# Patient Record
Sex: FEMALE
[De-identification: names, ages, dates, MRNs, and addresses within clinical notes are randomized per-mention and may not be internally consistent; named-entity substitution may affect disease eponyms.]

---

## 2017-05-09 ENCOUNTER — APPOINTMENT (OUTPATIENT)
Dept: INTERNAL MEDICINE | Facility: CLINIC | Age: 22
End: 2017-05-09

## 2017-05-09 VITALS
OXYGEN SATURATION: 98 % | RESPIRATION RATE: 16 BRPM | BODY MASS INDEX: 25.46 KG/M2 | HEART RATE: 76 BPM | WEIGHT: 168 LBS | SYSTOLIC BLOOD PRESSURE: 122 MMHG | HEIGHT: 68 IN | DIASTOLIC BLOOD PRESSURE: 78 MMHG | TEMPERATURE: 98.4 F

## 2017-05-09 DIAGNOSIS — Z82.49 FAMILY HISTORY OF ISCHEMIC HEART DISEASE AND OTHER DISEASES OF THE CIRCULATORY SYSTEM: ICD-10-CM

## 2017-05-09 DIAGNOSIS — Z87.891 PERSONAL HISTORY OF NICOTINE DEPENDENCE: ICD-10-CM

## 2017-07-05 ENCOUNTER — APPOINTMENT (OUTPATIENT)
Dept: ORTHOPEDIC SURGERY | Facility: CLINIC | Age: 22
End: 2017-07-05

## 2017-07-05 VITALS
DIASTOLIC BLOOD PRESSURE: 60 MMHG | SYSTOLIC BLOOD PRESSURE: 125 MMHG | HEART RATE: 87 BPM | HEIGHT: 68 IN | BODY MASS INDEX: 24.55 KG/M2 | WEIGHT: 162 LBS

## 2017-07-05 DIAGNOSIS — Z60.2 PROBLEMS RELATED TO LIVING ALONE: ICD-10-CM

## 2017-07-05 DIAGNOSIS — Z78.9 OTHER SPECIFIED HEALTH STATUS: ICD-10-CM

## 2017-07-05 SDOH — SOCIAL STABILITY - SOCIAL INSECURITY: PROBLEMS RELATED TO LIVING ALONE: Z60.2

## 2017-07-24 ENCOUNTER — OUTPATIENT (OUTPATIENT)
Dept: OUTPATIENT SERVICES | Facility: HOSPITAL | Age: 22
LOS: 1 days | Discharge: HOME | End: 2017-07-24
Payer: OTHER GOVERNMENT

## 2017-07-24 VITALS
DIASTOLIC BLOOD PRESSURE: 77 MMHG | WEIGHT: 166.01 LBS | TEMPERATURE: 98 F | RESPIRATION RATE: 18 BRPM | HEIGHT: 68 IN | HEART RATE: 74 BPM | SYSTOLIC BLOOD PRESSURE: 142 MMHG | OXYGEN SATURATION: 97 %

## 2017-07-24 DIAGNOSIS — Z01.818 ENCOUNTER FOR OTHER PREPROCEDURAL EXAMINATION: ICD-10-CM

## 2017-07-24 DIAGNOSIS — E28.2 POLYCYSTIC OVARIAN SYNDROME: ICD-10-CM

## 2017-07-24 DIAGNOSIS — S43.491A OTHER SPRAIN OF RIGHT SHOULDER JOINT, INITIAL ENCOUNTER: ICD-10-CM

## 2017-07-24 DIAGNOSIS — M25.511 PAIN IN RIGHT SHOULDER: ICD-10-CM

## 2017-07-24 LAB
ANION GAP SERPL CALC-SCNC: 6 MMOL/L — SIGNIFICANT CHANGE UP (ref 5–17)
BASOPHILS # BLD AUTO: 0.1 K/UL — SIGNIFICANT CHANGE UP (ref 0–0.2)
BASOPHILS NFR BLD AUTO: 1 % — SIGNIFICANT CHANGE UP (ref 0–2)
BUN SERPL-MCNC: 11 MG/DL — SIGNIFICANT CHANGE UP (ref 7–23)
CALCIUM SERPL-MCNC: 9.4 MG/DL — SIGNIFICANT CHANGE UP (ref 8.5–10.1)
CHLORIDE SERPL-SCNC: 106 MMOL/L — SIGNIFICANT CHANGE UP (ref 96–108)
CO2 SERPL-SCNC: 30 MMOL/L — SIGNIFICANT CHANGE UP (ref 22–31)
CREAT SERPL-MCNC: 0.78 MG/DL — SIGNIFICANT CHANGE UP (ref 0.5–1.3)
EOSINOPHIL # BLD AUTO: 0.3 K/UL — SIGNIFICANT CHANGE UP (ref 0–0.5)
EOSINOPHIL NFR BLD AUTO: 3.5 % — SIGNIFICANT CHANGE UP (ref 0–6)
GLUCOSE SERPL-MCNC: 74 MG/DL — SIGNIFICANT CHANGE UP (ref 70–99)
HCG UR QL: NEGATIVE — SIGNIFICANT CHANGE UP
HCT VFR BLD CALC: 44.1 % — SIGNIFICANT CHANGE UP (ref 34.5–45)
HGB BLD-MCNC: 14.5 G/DL — SIGNIFICANT CHANGE UP (ref 11.5–15.5)
LYMPHOCYTES # BLD AUTO: 2.6 K/UL — SIGNIFICANT CHANGE UP (ref 1–3.3)
LYMPHOCYTES # BLD AUTO: 35.8 % — SIGNIFICANT CHANGE UP (ref 13–44)
MCHC RBC-ENTMCNC: 31.2 PG — SIGNIFICANT CHANGE UP (ref 27–34)
MCHC RBC-ENTMCNC: 32.9 GM/DL — SIGNIFICANT CHANGE UP (ref 32–36)
MCV RBC AUTO: 94.8 FL — SIGNIFICANT CHANGE UP (ref 80–100)
MONOCYTES # BLD AUTO: 0.5 K/UL — SIGNIFICANT CHANGE UP (ref 0–0.9)
MONOCYTES NFR BLD AUTO: 6.1 % — SIGNIFICANT CHANGE UP (ref 2–14)
NEUTROPHILS # BLD AUTO: 4 K/UL — SIGNIFICANT CHANGE UP (ref 1.8–7.4)
NEUTROPHILS NFR BLD AUTO: 53.6 % — SIGNIFICANT CHANGE UP (ref 43–77)
PLATELET # BLD AUTO: 286 K/UL — SIGNIFICANT CHANGE UP (ref 150–400)
POTASSIUM SERPL-MCNC: 4.1 MMOL/L — SIGNIFICANT CHANGE UP (ref 3.5–5.3)
POTASSIUM SERPL-SCNC: 4.1 MMOL/L — SIGNIFICANT CHANGE UP (ref 3.5–5.3)
RBC # BLD: 4.65 M/UL — SIGNIFICANT CHANGE UP (ref 3.8–5.2)
RBC # FLD: 12 % — SIGNIFICANT CHANGE UP (ref 11–15)
SODIUM SERPL-SCNC: 142 MMOL/L — SIGNIFICANT CHANGE UP (ref 135–145)
WBC # BLD: 7.4 K/UL — SIGNIFICANT CHANGE UP (ref 3.8–10.5)
WBC # FLD AUTO: 7.4 K/UL — SIGNIFICANT CHANGE UP (ref 3.8–10.5)

## 2017-07-24 PROCEDURE — 93010 ELECTROCARDIOGRAM REPORT: CPT | Mod: NC

## 2017-07-24 RX ORDER — METFORMIN HYDROCHLORIDE 850 MG/1
0 TABLET ORAL
Qty: 0 | Refills: 0 | COMMUNITY

## 2017-07-24 NOTE — H&P PST ADULT - PROBLEM SELECTOR PROBLEM 2
Continue Regimen: Cetaphil cleanser Samples Given: EpiDuo Forte Detail Level: Simple Plan: Will consider genrx generic EpiDuo when available if not covered by insurance PCOS (polycystic ovarian syndrome)

## 2017-07-24 NOTE — H&P PST ADULT - HISTORY OF PRESENT ILLNESS
This is a 22 y/o female with c/o right shoulder pain. Patient is here today for Pre-surgical clearance for elective Right shoulder arthroscopy.

## 2017-07-31 ENCOUNTER — APPOINTMENT (OUTPATIENT)
Dept: ORTHOPEDIC SURGERY | Facility: HOSPITAL | Age: 22
End: 2017-07-31

## 2017-07-31 ENCOUNTER — TRANSCRIPTION ENCOUNTER (OUTPATIENT)
Age: 22
End: 2017-07-31

## 2017-07-31 ENCOUNTER — OUTPATIENT (OUTPATIENT)
Dept: OUTPATIENT SERVICES | Facility: HOSPITAL | Age: 22
LOS: 1 days | Discharge: HOME | End: 2017-07-31
Payer: OTHER GOVERNMENT

## 2017-07-31 ENCOUNTER — RESULT REVIEW (OUTPATIENT)
Age: 22
End: 2017-07-31

## 2017-07-31 VITALS
DIASTOLIC BLOOD PRESSURE: 64 MMHG | WEIGHT: 160.06 LBS | OXYGEN SATURATION: 99 % | TEMPERATURE: 98 F | HEIGHT: 68 IN | HEART RATE: 59 BPM | SYSTOLIC BLOOD PRESSURE: 116 MMHG

## 2017-07-31 VITALS
HEART RATE: 76 BPM | DIASTOLIC BLOOD PRESSURE: 72 MMHG | SYSTOLIC BLOOD PRESSURE: 122 MMHG | RESPIRATION RATE: 17 BRPM | OXYGEN SATURATION: 100 %

## 2017-07-31 PROCEDURE — 29806 SHO ARTHRS SRG CAPSULORRAPHY: CPT | Mod: RT

## 2017-07-31 PROCEDURE — 88304 TISSUE EXAM BY PATHOLOGIST: CPT | Mod: 26

## 2017-07-31 RX ORDER — DOCUSATE SODIUM 100 MG
1 CAPSULE ORAL
Qty: 60 | Refills: 0 | OUTPATIENT
Start: 2017-07-31

## 2017-07-31 RX ORDER — OXYCODONE HYDROCHLORIDE 5 MG/1
1 TABLET ORAL
Qty: 60 | Refills: 0 | OUTPATIENT
Start: 2017-07-31

## 2017-07-31 RX ORDER — ONDANSETRON 8 MG/1
1 TABLET, FILM COATED ORAL
Qty: 10 | Refills: 0 | OUTPATIENT
Start: 2017-07-31

## 2017-07-31 RX ORDER — ACETAMINOPHEN 500 MG
1000 TABLET ORAL ONCE
Qty: 0 | Refills: 0 | Status: COMPLETED | OUTPATIENT
Start: 2017-07-31 | End: 2017-07-31

## 2017-07-31 RX ORDER — SODIUM CHLORIDE 9 MG/ML
1000 INJECTION, SOLUTION INTRAVENOUS
Qty: 0 | Refills: 0 | Status: DISCONTINUED | OUTPATIENT
Start: 2017-07-31 | End: 2017-08-15

## 2017-07-31 RX ADMIN — Medication 1000 MILLIGRAM(S): at 15:00

## 2017-07-31 RX ADMIN — Medication 400 MILLIGRAM(S): at 14:46

## 2017-07-31 RX ADMIN — SODIUM CHLORIDE 85 MILLILITER(S): 9 INJECTION, SOLUTION INTRAVENOUS at 14:46

## 2017-07-31 NOTE — ASU DISCHARGE PLAN (ADULT/PEDIATRIC). - ACTIVITY LEVEL
R Shoulder In Shoulder Immobilizer/no sports/gym/no weight bearing/elevate extremity/no heavy lifting/no exercise

## 2017-07-31 NOTE — ASU DISCHARGE PLAN (ADULT/PEDIATRIC). - MEDICATION SUMMARY - MEDICATIONS TO TAKE
I will START or STAY ON the medications listed below when I get home from the hospital:    oxyCODONE 5 mg oral tablet  -- 1-2 tab(s) by mouth every 4-6 hours, As Needed -for severe pain MDD:12 tabs  -- Caution federal law prohibits the transfer of this drug to any person other  than the person for whom it was prescribed.  It is very important that you take or use this exactly as directed.  Do not skip doses or discontinue unless directed by your doctor.  May cause drowsiness.  Alcohol may intensify this effect.  Use care when operating dangerous machinery.  This prescription cannot be refilled.  Using more of this medication than prescribed may cause serious breathing problems.    -- Indication: For pain    metFORMIN 500 mg oral tablet  --  by mouth 3 times a day  -- Indication: For Home med    ondansetron 4 mg oral tablet, disintegrating  -- 1 tab(s) by mouth every 6 hours, As Needed -for nausea  -- Indication: For Nausea/vomiting    docusate sodium 100 mg oral tablet  -- 1 tab(s) by mouth 3 times a day, As Needed -for constipation  -- Medication should be taken with plenty of water.    -- Indication: For constipation

## 2017-08-01 LAB — SURGICAL PATHOLOGY FINAL REPORT - CH: SIGNIFICANT CHANGE UP

## 2017-08-02 DIAGNOSIS — M24.411 RECURRENT DISLOCATION, RIGHT SHOULDER: ICD-10-CM

## 2017-08-02 DIAGNOSIS — M25.311 OTHER INSTABILITY, RIGHT SHOULDER: ICD-10-CM

## 2017-08-02 DIAGNOSIS — M67.813 OTHER SPECIFIED DISORDERS OF TENDON, RIGHT SHOULDER: ICD-10-CM

## 2017-08-18 ENCOUNTER — APPOINTMENT (OUTPATIENT)
Dept: ORTHOPEDIC SURGERY | Facility: CLINIC | Age: 22
End: 2017-08-18
Payer: OTHER GOVERNMENT

## 2017-08-18 PROCEDURE — 99024 POSTOP FOLLOW-UP VISIT: CPT

## 2017-09-15 ENCOUNTER — APPOINTMENT (OUTPATIENT)
Dept: ORTHOPEDIC SURGERY | Facility: CLINIC | Age: 22
End: 2017-09-15
Payer: OTHER GOVERNMENT

## 2017-09-15 PROCEDURE — 99024 POSTOP FOLLOW-UP VISIT: CPT

## 2017-11-01 ENCOUNTER — APPOINTMENT (OUTPATIENT)
Dept: ORTHOPEDIC SURGERY | Facility: CLINIC | Age: 22
End: 2017-11-01
Payer: OTHER GOVERNMENT

## 2017-11-01 PROCEDURE — 99213 OFFICE O/P EST LOW 20 MIN: CPT

## 2017-11-22 ENCOUNTER — TRANSCRIPTION ENCOUNTER (OUTPATIENT)
Age: 22
End: 2017-11-22

## 2018-02-22 ENCOUNTER — RESULT REVIEW (OUTPATIENT)
Age: 23
End: 2018-02-22

## 2018-03-19 ENCOUNTER — APPOINTMENT (OUTPATIENT)
Dept: INTERNAL MEDICINE | Facility: CLINIC | Age: 23
End: 2018-03-19

## 2018-03-26 ENCOUNTER — APPOINTMENT (OUTPATIENT)
Dept: INTERNAL MEDICINE | Facility: CLINIC | Age: 23
End: 2018-03-26

## 2018-04-02 ENCOUNTER — APPOINTMENT (OUTPATIENT)
Dept: INTERNAL MEDICINE | Facility: CLINIC | Age: 23
End: 2018-04-02
Payer: OTHER GOVERNMENT

## 2018-04-02 VITALS
OXYGEN SATURATION: 97 % | HEART RATE: 81 BPM | SYSTOLIC BLOOD PRESSURE: 129 MMHG | BODY MASS INDEX: 24.55 KG/M2 | RESPIRATION RATE: 16 BRPM | TEMPERATURE: 99.2 F | WEIGHT: 162 LBS | HEIGHT: 68 IN | DIASTOLIC BLOOD PRESSURE: 81 MMHG

## 2018-04-02 DIAGNOSIS — S43.491D OTHER SPRAIN OF RIGHT SHOULDER JOINT, SUBSEQUENT ENCOUNTER: ICD-10-CM

## 2018-04-02 DIAGNOSIS — Z78.9 OTHER SPECIFIED HEALTH STATUS: ICD-10-CM

## 2018-04-02 DIAGNOSIS — M24.112 OTHER ARTICULAR CARTILAGE DISORDERS, LEFT SHOULDER: ICD-10-CM

## 2018-04-02 PROCEDURE — 99395 PREV VISIT EST AGE 18-39: CPT

## 2018-04-02 RX ORDER — METFORMIN HYDROCHLORIDE 625 MG/1
TABLET ORAL
Refills: 0 | Status: DISCONTINUED | COMMUNITY
End: 2018-04-02

## 2018-04-04 DIAGNOSIS — E83.52 HYPERCALCEMIA: ICD-10-CM

## 2018-04-04 LAB
25(OH)D3 SERPL-MCNC: 14.8 NG/ML
ALBUMIN SERPL ELPH-MCNC: 5.2 G/DL
ALP BLD-CCNC: 79 U/L
ALT SERPL-CCNC: 40 U/L
ANION GAP SERPL CALC-SCNC: 15 MMOL/L
APPEARANCE: CLEAR
AST SERPL-CCNC: 47 U/L
BASOPHILS # BLD AUTO: 0.03 K/UL
BASOPHILS NFR BLD AUTO: 0.3 %
BILIRUB SERPL-MCNC: 0.2 MG/DL
BILIRUBIN URINE: NEGATIVE
BLOOD URINE: NEGATIVE
BUN SERPL-MCNC: 19 MG/DL
CALCIUM SERPL-MCNC: 10.8 MG/DL
CALCIUM SERPL-MCNC: 11 MG/DL
CHLORIDE SERPL-SCNC: 101 MMOL/L
CHOLEST SERPL-MCNC: 233 MG/DL
CHOLEST/HDLC SERPL: 4.6 RATIO
CO2 SERPL-SCNC: 24 MMOL/L
COLOR: YELLOW
CREAT SERPL-MCNC: 0.88 MG/DL
EOSINOPHIL # BLD AUTO: 0.15 K/UL
EOSINOPHIL NFR BLD AUTO: 1.3 %
GLUCOSE QUALITATIVE U: NEGATIVE MG/DL
GLUCOSE SERPL-MCNC: 72 MG/DL
HCT VFR BLD CALC: 42.1 %
HDLC SERPL-MCNC: 51 MG/DL
HGB BLD-MCNC: 13.9 G/DL
IMM GRANULOCYTES NFR BLD AUTO: 0.2 %
KETONES URINE: NEGATIVE
LDLC SERPL CALC-MCNC: 143 MG/DL
LEUKOCYTE ESTERASE URINE: NEGATIVE
LYMPHOCYTES # BLD AUTO: 3.13 K/UL
LYMPHOCYTES NFR BLD AUTO: 26.6 %
MAN DIFF?: NORMAL
MCHC RBC-ENTMCNC: 30.3 PG
MCHC RBC-ENTMCNC: 33 GM/DL
MCV RBC AUTO: 91.9 FL
MONOCYTES # BLD AUTO: 0.83 K/UL
MONOCYTES NFR BLD AUTO: 7.1 %
NEUTROPHILS # BLD AUTO: 7.61 K/UL
NEUTROPHILS NFR BLD AUTO: 64.5 %
NITRITE URINE: NEGATIVE
PARATHYROID HORMONE INTACT: 24 PG/ML
PH URINE: 6.5
PLATELET # BLD AUTO: 331 K/UL
POTASSIUM SERPL-SCNC: 4.2 MMOL/L
PROLACTIN SERPL-MCNC: 8.8 NG/ML
PROT SERPL-MCNC: 8.4 G/DL
PROTEIN URINE: NEGATIVE MG/DL
RBC # BLD: 4.58 M/UL
RBC # FLD: 12.9 %
SODIUM SERPL-SCNC: 140 MMOL/L
SPECIFIC GRAVITY URINE: 1.02
TRIGL SERPL-MCNC: 197 MG/DL
TSH SERPL-ACNC: 1.93 UIU/ML
UROBILINOGEN URINE: NEGATIVE MG/DL
WBC # FLD AUTO: 11.77 K/UL

## 2018-06-05 ENCOUNTER — RX RENEWAL (OUTPATIENT)
Age: 23
End: 2018-06-05

## 2019-03-18 PROBLEM — E28.2 POLYCYSTIC OVARIAN SYNDROME: Chronic | Status: ACTIVE | Noted: 2017-07-24

## 2019-04-30 ENCOUNTER — APPOINTMENT (OUTPATIENT)
Dept: INTERNAL MEDICINE | Facility: CLINIC | Age: 24
End: 2019-04-30
Payer: OTHER GOVERNMENT

## 2019-06-18 ENCOUNTER — LABORATORY RESULT (OUTPATIENT)
Age: 24
End: 2019-06-18

## 2019-06-18 ENCOUNTER — APPOINTMENT (OUTPATIENT)
Dept: INTERNAL MEDICINE | Facility: CLINIC | Age: 24
End: 2019-06-18
Payer: OTHER GOVERNMENT

## 2019-06-18 VITALS
HEIGHT: 68 IN | SYSTOLIC BLOOD PRESSURE: 136 MMHG | DIASTOLIC BLOOD PRESSURE: 81 MMHG | HEART RATE: 82 BPM | BODY MASS INDEX: 26.07 KG/M2 | WEIGHT: 172 LBS | TEMPERATURE: 98.5 F | OXYGEN SATURATION: 82 %

## 2019-06-18 DIAGNOSIS — F32.9 ANXIETY DISORDER, UNSPECIFIED: ICD-10-CM

## 2019-06-18 DIAGNOSIS — Z00.00 ENCOUNTER FOR GENERAL ADULT MEDICAL EXAMINATION W/OUT ABNORMAL FINDINGS: ICD-10-CM

## 2019-06-18 DIAGNOSIS — F41.9 ANXIETY DISORDER, UNSPECIFIED: ICD-10-CM

## 2019-06-18 DIAGNOSIS — R79.89 OTHER SPECIFIED ABNORMAL FINDINGS OF BLOOD CHEMISTRY: ICD-10-CM

## 2019-06-18 DIAGNOSIS — Z87.42 PERSONAL HISTORY OF OTHER DISEASES OF THE FEMALE GENITAL TRACT: ICD-10-CM

## 2019-06-18 DIAGNOSIS — E55.9 VITAMIN D DEFICIENCY, UNSPECIFIED: ICD-10-CM

## 2019-06-18 DIAGNOSIS — R79.9 ABNORMAL FINDING OF BLOOD CHEMISTRY, UNSPECIFIED: ICD-10-CM

## 2019-06-18 DIAGNOSIS — Z11.3 ENCOUNTER FOR SCREENING FOR INFECTIONS WITH A PREDOMINANTLY SEXUAL MODE OF TRANSMISSION: ICD-10-CM

## 2019-06-18 DIAGNOSIS — M24.411 RECURRENT DISLOCATION, RIGHT SHOULDER: ICD-10-CM

## 2019-06-18 DIAGNOSIS — E83.52 HYPERCALCEMIA: ICD-10-CM

## 2019-06-18 DIAGNOSIS — E28.2 POLYCYSTIC OVARIAN SYNDROME: ICD-10-CM

## 2019-06-18 PROCEDURE — 99395 PREV VISIT EST AGE 18-39: CPT

## 2019-06-18 RX ORDER — NORELGESTROMIN AND ETHINYL ESTRADIOL 150; 35 UG/D; UG/D
150-35 PATCH TRANSDERMAL
Qty: 3 | Refills: 0 | Status: ACTIVE | COMMUNITY
Start: 2019-04-18

## 2019-06-18 RX ORDER — ERGOCALCIFEROL 1.25 MG/1
1.25 MG CAPSULE, LIQUID FILLED ORAL
Qty: 8 | Refills: 0 | Status: DISCONTINUED | COMMUNITY
Start: 2018-04-04 | End: 2019-06-18

## 2019-06-18 RX ORDER — FOLIC ACID 1 MG/1
1 TABLET ORAL
Refills: 0 | Status: DISCONTINUED | COMMUNITY
End: 2019-06-18

## 2019-06-18 RX ORDER — MULTIVITAMIN
TABLET ORAL
Refills: 0 | Status: ACTIVE | COMMUNITY

## 2019-06-18 RX ORDER — METFORMIN HYDROCHLORIDE 500 MG/1
500 TABLET, COATED ORAL 3 TIMES DAILY
Refills: 0 | Status: ACTIVE | COMMUNITY

## 2019-06-19 PROBLEM — E83.52 SERUM CALCIUM ELEVATED: Status: ACTIVE | Noted: 2019-06-19

## 2019-06-20 ENCOUNTER — TRANSCRIPTION ENCOUNTER (OUTPATIENT)
Age: 24
End: 2019-06-20

## 2019-06-20 LAB
25(OH)D3 SERPL-MCNC: 35.1 NG/ML
ALBUMIN SERPL ELPH-MCNC: 5 G/DL
ALP BLD-CCNC: 80 U/L
ALT SERPL-CCNC: 19 U/L
ANION GAP SERPL CALC-SCNC: 16 MMOL/L
APPEARANCE: CLEAR
AST SERPL-CCNC: 31 U/L
BASOPHILS # BLD AUTO: 0.04 K/UL
BASOPHILS NFR BLD AUTO: 0.3 %
BILIRUB SERPL-MCNC: 0.4 MG/DL
BILIRUBIN URINE: NEGATIVE
BLOOD URINE: NEGATIVE
BUN SERPL-MCNC: 16 MG/DL
C TRACH RRNA SPEC QL NAA+PROBE: NOT DETECTED
CALCIUM SERPL-MCNC: 10.1 MG/DL
CALCIUM SERPL-MCNC: 10.9 MG/DL
CHLORIDE SERPL-SCNC: 96 MMOL/L
CHOLEST SERPL-MCNC: 232 MG/DL
CHOLEST/HDLC SERPL: 2.8 RATIO
CO2 SERPL-SCNC: 26 MMOL/L
COLOR: YELLOW
CREAT SERPL-MCNC: 1.04 MG/DL
EOSINOPHIL # BLD AUTO: 0.13 K/UL
EOSINOPHIL NFR BLD AUTO: 0.9 %
ESTIMATED AVERAGE GLUCOSE: 105 MG/DL
GLUCOSE QUALITATIVE U: NEGATIVE
GLUCOSE SERPL-MCNC: 87 MG/DL
HBA1C MFR BLD HPLC: 5.3 %
HCT VFR BLD CALC: 45.3 %
HDLC SERPL-MCNC: 84 MG/DL
HGB BLD-MCNC: 14.3 G/DL
HIV1+2 AB SPEC QL IA.RAPID: NONREACTIVE
IMM GRANULOCYTES NFR BLD AUTO: 0.5 %
KETONES URINE: NEGATIVE
LDLC SERPL CALC-MCNC: 117 MG/DL
LEUKOCYTE ESTERASE URINE: ABNORMAL
LYMPHOCYTES # BLD AUTO: 3.64 K/UL
LYMPHOCYTES NFR BLD AUTO: 24.6 %
MAN DIFF?: NORMAL
MCHC RBC-ENTMCNC: 31.4 PG
MCHC RBC-ENTMCNC: 31.6 GM/DL
MCV RBC AUTO: 99.3 FL
MONOCYTES # BLD AUTO: 0.91 K/UL
MONOCYTES NFR BLD AUTO: 6.1 %
N GONORRHOEA RRNA SPEC QL NAA+PROBE: NOT DETECTED
NEUTROPHILS # BLD AUTO: 10.02 K/UL
NEUTROPHILS NFR BLD AUTO: 67.6 %
NITRITE URINE: NEGATIVE
PARATHYROID HORMONE INTACT: 16 PG/ML
PH URINE: 6.5
PLATELET # BLD AUTO: 358 K/UL
POTASSIUM SERPL-SCNC: 4.8 MMOL/L
PROT SERPL-MCNC: 8.2 G/DL
PROTEIN URINE: NORMAL
RBC # BLD: 4.56 M/UL
RBC # FLD: 13 %
SODIUM SERPL-SCNC: 138 MMOL/L
SOURCE AMPLIFICATION: NORMAL
SPECIFIC GRAVITY URINE: 1.02
T PALLIDUM AB SER DONR QL IF: NONREACTIVE
TRIGL SERPL-MCNC: 154 MG/DL
TSH SERPL-ACNC: 1 UIU/ML
UROBILINOGEN URINE: NORMAL
WBC # FLD AUTO: 14.81 K/UL

## 2019-06-21 LAB
HSV1 IGM SER QL: NORMAL TITER
HSV2 AB FLD-ACNC: NORMAL TITER

## 2019-06-23 LAB
TESTOST BND SERPL-MCNC: 0.8 PG/ML
TESTOST SERPL-MCNC: 33.6 NG/DL

## 2019-10-02 PROBLEM — Z60.2 PERSON LIVING ALONE: Status: ACTIVE | Noted: 2017-07-05

## 2021-04-30 NOTE — H&P PST ADULT - BLOOD AVOIDANCE/RESTRICTIONS, PROFILE
Your Child's Health  Over 15 Year Old      Imtiaz Sen  April 30, 2021    Visit Vitals  BP (!) 118/58 (BP Location: RUE - Right upper extremity, Patient Position: Sitting, Cuff Size: Regular)   Pulse 80   Ht 5' 7.25\" (1.708 m)   Wt 66.4 kg   SpO2 100%   BMI 22.76 kg/m²     Weight: 146.4 lbs      Your Teen Check-Up Visit  Body Image  Teens are faced with a lot of pressure from the media to look a certain way. Many of the body images portrayed in the media are not healthy. The age old combination of healthy eating and being physically active is the best way to stay at a healthy weight.    Eating, Drinking & Exercise  As teens spend more time away from home, the temptation to eat more high-fat, high calorie convenience foods is common. (It's also tempting to eat more than you need, especially when hanging out with friends, but it's important to stop eating when you feel full.) Learn about how to make healthy choices when not at home, and consider carrying healthy snacks from home rather than relying on vending machines and fast food when out. (The [employer]'s choosemyplate.gov is a great educational website about nutrition.) Getting enough vitamin D and calcium is important for good bone health. Teens need to get 3 to 4 servings of a good source of calcium daily (low-fat or skim milk, yogurt, cheese, calcium-fortified orange juice or other calcium-fortified foods). Vitamin D is needed along with calcium to optimize bone health; 600 IU of vitamin D daily is recommended. Most people cannot meet their vitamin D needs with their usual diet, so a multivitamin with iron supplement is a good way to get it. (A pure vitamin D supplement with 400 or 600 IU is also okay.)    Choosing to drink plenty of water and avoiding or limiting sodas, energy drinks, juices and other sweet drinks (iced tea, etc) is an easy and healthy choice to make. It is common for teenagers to skip breakfast due to time constraints and simply not feeling  hungry in the morning. However, eating something healthy in the morning is important in order to function best at school and avoid overeating later in the day. Teens should set a goal of being physically active for at least 60 minutes a day. This can be tough because of more homework and because gym classes are often not required in the higher grades. If you're not involved in sports, find activities that you like, such as biking, swimming, and dancing. [When participating in sports, make sure to use appropriate safety equipment (helmet, mouth guard, eye protection, wrist guards, elbow and knee pads, athletic supporter).] Choose biking and walking as your mode of transportation whenever it is safe to do so. Choosing to spend time on your phone or computer is a lot easier and a lot more tempting than putting your electronics down and making yourself move - but it is really important to your overall health.      Sleep   Teenagers need a lot of sleep. It can be difficult because most teens don't feel the need to go to sleep until later in the evening, and then they have to get up for school before they've had adequate rest. Keeping your phone, TV, and other electronic media out of your room and avoiding using them in the hour so before bedtime can help you sleep better. Also, avoid drinking a lot of caffeine, especially later in the day.     Dental  It is important to take good care of your permanent teeth - this is the last set you are going to get! Brush at least twice a day (always before bed) with fluoridated toothpaste, floss regularly and see a dentist twice a year. If your home water supply is from a well, let us know - fluoride supplements may be recommended up to 16 years of age.    Violence & Bullying  Violence is out there. Any exposure to violence (as a victim, witness or perpetrator) is dangerous and harmful (emotionally and physically). Do your best to avoid situations where you know there is a risk of  violence, bullying or fighting. Try to stay in a group when you are going between places or on outings. If you are being teased or bullied, stand up to the person doing it if you can--remember, bullies want to see their victims upset, so be calm, don’t appear flustered, tell them to stop it and walk away. Laugh at them if you can; joking will throw a bully off guard because that is not the response they expect. If you or someone you know is being bullied or harmed, ask a guidance counselor, , health care provider or other trusted adult about what you can do to resolve the situation. Most schools have strict policies in place to protect against bullying. Don’t start skipping school to avoid a bully; talk to someone because things can be better.     Be very careful about what you post online--to protect yourself from being attacked as well as to make sure something you post will not be interpreted as hurtful or critical. Nothing is truly private in cyberspace; make sure you do not post anything online (texts, photos, emails) that you would not be comfortable having read out loud in a public place.     If you are dating, violence should NEVER be tolerated in a relationship-- this includes physical violence, emotional abuse (shaming, embarrassing, threatening, controlling) or sexual abuse (forcing a partner to participate in a sex act when they do not or cannot consent to it).  If you are uncomfortable with anything in your relationship, talk about it now. If you can’t talk to your partner, talk to a trusted adult, guidance counselor, teacher, or health care provider. If you (or a friend) need help right away, there are hotlines are available. One is through loveisrespect: call 1-677.289.3720, chat at loveisrespect.org or text “loveis” to 88446 (available 24/7/365). You can also call the National Domestic Violence 1-550.608.3886.     Healthy Emotions  Being a teen can be tough. Demands of school,  relationship challenges (friends, family, dating), and new responsibilities and expectations can lead to stress that may sometimes seem overwhelming. Depression and anxiety can affect teens; they can interfere with your day to day functioning and keep you from enjoying things that you usually enjoy. When you're feeling stressed out and overwhelmed, the most important thing to do is talk to someone that you trust and who cares about you. Alcohol, drugs or self-harming acts will not solve any of your problems and will ultimately only make things worse. If you (or a friend) are ever feeling overwhelmed by sadness or fear or anxiety to the point that don't feel you can do what you need to do from day to day or that you wished you were not alive, you need to ask for help. If you don't have a trusted adult in your life, talk to a friend, a teacher, a counselor or health care provider. There is an anonymous emergency hotline (through National Suicide Prevention Lifeline) for teens who are feeling desperate enough to hurt themselves: chat at https://suicidepreventionlifeline.org/ or call 1-831.792.1468. Hopefully you will get through your teenage years without any significant emotional difficulties, but you may have friends who struggle. Be there for them, and help them get help if you can see that they need it.    Sex, Drugs, & Rock n Roll  Thinking about sex and relationships at your age is normal. Teens may have questions about their sexual orientation and gender identity. You can always talk to your healthcare providers when you have questions about these issues. You should know that everyone is entitled to complete, nonjudgmental and confidential health at this clinic. We also encourage you to talk to your family and friends, if you believe they will be supportive. If you are unable to talk to your family or if you need additional support, there are plenty of resources which we can refer you to. Good online resources  include https://www.cdc.gov/lgbthealth/youth-resources.htm and http://www.[facility].org/programs/youth/.    You are undoubtedly aware of the risks associated with having sex. Because teens who have babies or father a child are faced with the tremendous responsibilities and challenges of caring for a child, their own goals and dreams become more difficult to fulfill and may even be altered because of the responsibility of caring for a child. Also, pregnancy can carry high medical risks in young teens. There are several contraception (birth control) options for teens to help prevent pregnancy - but the most effective one is still choosing not to have sex. (Large national studies of teenagers show that most teens who had sex at a young age wish they had waited longer.) Sexually-transmitted infections (STIs) are another major risk of having sex. It is estimated that about 20 million new STIs occur every year, and about half of those are in teens and young adults between 15 to 24 years of age. Female teens, in particular, are at risk for complications from STIs; some of which can cause them to have problems with their fertility (their ability to have children) later in life when they want to be pregnant. To protect against STI's, condoms need to be used during every sexual encounter, even if a female is using another form of contraception. For teens who choose to be sexually active, it is very important that they have regular health care check-ups to discuss contraception and perform STI screening if indicated. Plus, you should see a health care provided any time you are concerned about a possible problem (pregnancy, STI) or want to discuss birth control. Despite what you see on TV and in the media, you should know that most teenagers your age choose NOT to have sex. There is a lot of pressure out there for teens to experiment sexually, so make decisions to avoid places (and people) where you know that kind of pressure  might be put on you. For reliable information about sex and birth control, good websites are safeteens.org and bedsider.org.    If you make the decision to become sexually active, you should know that in the [facility], teens who are age 14 or older are entitled to confidential reproductive health care - that means that you can talk to your providers and receive care for sex-related issues (contraception, STIs, pregnancy) without your parents being notified. We encourage teens to talk to their parents when they are considering starting to have sex, but not everyone can--that is why this law exists. (The only exception to confidential care would be if your providers believe you are at risk for harming yourself or someone else.) Sexually active teens should learn about emergency contraception (\"the morning after pill \"or \"Plan B) which can significantly reduce the chance of pregnancy if a young woman takes it shortly after having unprotected sex. You should also know that even if you make the decision to have sex once, you do not have to keep doing it if you didn’t feel like it was the right decision for you.     Most teens have heard plenty by now about the dangers and health risks of alcohol, drugs and smoking. Some people think vaping is a safe alternative to smoking, but there is no proof that it is. Any inhaled substance is going to cause harm to your lungs and most can cause harm to the brain as well. You should not take prescription medicines if they were not prescribed for you, and you should never let anyone else take your prescription drugs. Avoid situations where you know there is likely to be alcohol and drugs which you will be pressured to try; hang out with friends who share your decision not to use these substances. In addition to the dangers associated with drug use, legal involvement and drug testing policies mean using drugs today can interfere with sports, job and college opportunities. You  can talk to your health care provider about drug use if you have questions or concerns; good online resources include https://teens.drugabuse.gov/ and https://www.the[place].gov/real_life4.aspx  .    Industries have rules about protecting workers from loud noises because they are known to cause hearing loss. Nearly 6% of teenagers were identified as having a mild level of permanent hearing loss due to loud music and ear bud use in a national study. To protect your hearing, avoid prolonged use of earbuds and music at loud volumes and protect yourself with earplugs or other hearing protection devices) when exposed to loud noises (concerts, lawnmowers, snowblowers, etc.).    Safety on Wheels  The leading cause of death for adolescents is motor vehicle accidents. Wearing a seatbelt significantly reduces your chance of serious injury or death when riding in a car. If you ever find yourself in a situation where do not feel safe with the  of your car (whether that is yourself or someone else), the right decision is to call for a ride from someone else. This could be a matter of life or death-do not hesitate to make this type of call.    Texting, calling or using any kind of electronic device is distracting to a  and the cause of many serious accidents. Whether it is you or someone else driving, drivers should never be using mobile devices when they are behind the wheel. Put your phone out of reach or in the trunk if you do not trust yourself to ignore it while driving.    Safety in the Sun  Protecting yourself from the sun’s UV radiation is your responsibility - your parent is no longer going to danyell after you to apply sunscreen. Whether you tan or burn, spending time in the sun without sunscreen protection increases your risk of skin cancer and premature skin aging. To protect yourself, always wear a generous amount of sunscreen with an SPF of 15 or higher, reapply it frequently, avoid prolonged time in the  sun between 11 AM and 3 PM (when the sun is the hottest), and wear sunglasses and sun protective clothing when in the sun. Use of tanning beds further increases the risk of skin cancer; tanning bed use is illegal for teens under age 16 years old in [place] and many  want  to increase that law to apply to everyone under 18 years of age because of the health risks.    Helmets should always be worn in riding a skateboard, bike, motorcycle, or ATV. They should also be worn when skating or skateboarding. When boating or doing water sports, always wear a [employer] approved lifejacket, even if you feel you are a good swimmer.    Gun Safety  Firearms are dangerous. If someone you know has a firearm, you should not attempt handle it or use it. Gun safety courses are usually available for teens who want to hunt, but even with training, you should never handle or use a firearm without experienced supervision. Carrying a handgun for protection is not recommended because it is dangerous, and it is illegal under the age of 21.      MEDICATION FOR FEVER OR PAIN:   Acetaminophen liquid (e.g., Tylenol or Tempra) may be given every four hours as needed for pain or fever.  Acetaminophen liquid is less concentrated than the infant dropper bottle type.  Be sure to check which product CONCENTRATION you are using.      CHILDREN’S Tylenol/Acetaminophen  (160 MG/5 mL)    Child’s Weight:  Dose:  36 - 47 pounds:    240 mg (7.5 mL (1 1/2 Teaspoons))  48 - 59 pounds:    320 mg (10.0 mL (2 Teaspoons))  60 - 71 pounds:    400 mg (12.5 mL (2 1/2 Teaspoons))  72 - 95 pounds:    480 mg (15.0 mL (3 Teaspoons))  Greater than 96 pounds:   640 mg (20.0 mL (4 Teaspoons))    CHILDREN’S Tylenol/Acetaminophen MELTAWAYS ( 80 MG tablets)    Child’s Weight:  Dose:  36 - 47 pounds:    240 mg (3 meltaway tablets)  48 - 59 pounds:    320 mg (4 meltaway tablets)  60 - 71 pounds:    400 mg (5 meltaway tablets)  72 - 95 pounds:    480 mg (6  meltaway tablets)  Greater than 96 pounds:   640 mg (8 meltaway tablets)     (Jr) Tylenol/Acetaminophen MELTAWAYS (160 MG tablets)    Child’s Weight:  Dose:  36 - 47 pounds:    240 mg (1 1/2 meltaway tablets)  48 - 59 pounds:    320 mg (2 meltaway tablets)  60 - 71 pounds:    400 mg (2 1/2 meltaway tablets)  72 - 95 pounds:    480 mg (3 meltaway tablets)  Greater than 96 pounds:   640 mg (4 meltaway tablets)      CHILDREN'S Ibuprofen (e.g., Advil or Motrin) may be given every six hours as needed for pain or fever.    48 - 59 pounds:                       200 mg (2 Teaspoons)  60 - 71 pounds:                       250 mg (2 1/2 Teaspoons)  72 - 95 pounds:                       300 mg (3 Teaspoons)    NEXT VISIT: 1 year      Thank you for entrusting your care to [facility] [place].    Also, check out “Children’s Health” on the [facility] [place] [facility] for updates on timely topics regarding children’s health!   none

## 2024-07-02 NOTE — ASU PREOP CHECKLIST - NSWEIGHTCALCTOOLDRUG_GEN_A_CORE
Patient states that she has been having SOB on exertion and increased fatigue, she is under a lot of stress, please advise     used